# Patient Record
Sex: FEMALE
[De-identification: names, ages, dates, MRNs, and addresses within clinical notes are randomized per-mention and may not be internally consistent; named-entity substitution may affect disease eponyms.]

---

## 2019-06-02 ENCOUNTER — RX ONLY (RX ONLY)
Age: 64
End: 2019-06-02

## 2022-05-16 ENCOUNTER — TRANSCRIPTION ENCOUNTER (OUTPATIENT)
Age: 67
End: 2022-05-16

## 2022-05-17 ENCOUNTER — OUTPATIENT (OUTPATIENT)
Dept: OUTPATIENT SERVICES | Facility: HOSPITAL | Age: 67
LOS: 1 days | End: 2022-05-17
Payer: MEDICARE

## 2022-05-17 ENCOUNTER — APPOINTMENT (OUTPATIENT)
Dept: DISASTER EMERGENCY | Facility: HOSPITAL | Age: 67
End: 2022-05-17

## 2022-05-17 VITALS
TEMPERATURE: 98 F | WEIGHT: 259.93 LBS | HEART RATE: 70 BPM | OXYGEN SATURATION: 95 % | HEIGHT: 70 IN | DIASTOLIC BLOOD PRESSURE: 82 MMHG | SYSTOLIC BLOOD PRESSURE: 127 MMHG | RESPIRATION RATE: 18 BRPM

## 2022-05-17 VITALS
OXYGEN SATURATION: 95 % | HEART RATE: 60 BPM | RESPIRATION RATE: 17 BRPM | TEMPERATURE: 98 F | SYSTOLIC BLOOD PRESSURE: 108 MMHG | DIASTOLIC BLOOD PRESSURE: 70 MMHG

## 2022-05-17 DIAGNOSIS — U07.1 COVID-19: ICD-10-CM

## 2022-05-17 PROCEDURE — M0222: CPT

## 2022-05-17 RX ORDER — BEBTELOVIMAB 87.5 MG/ML
175 INJECTION, SOLUTION INTRAVENOUS ONCE
Refills: 0 | Status: COMPLETED | OUTPATIENT
Start: 2022-05-17 | End: 2022-05-17

## 2022-05-17 RX ADMIN — BEBTELOVIMAB 175 MILLIGRAM(S): 87.5 INJECTION, SOLUTION INTRAVENOUS at 13:57

## 2022-05-17 NOTE — CHART NOTE - NSCHARTNOTEFT_GEN_A_CORE
CC: Monoclonal Antibody Injection - COVID 19 Positive or significant COVID exposure       History: Patient presents for an injection of Bebtelovimab monoclonal antibodies. Patient has been screened and was deemed to be a candidate.    Date tested COVID positive: 5/14      COVID Symptoms:  Date of onset: 5/13  * Fever-  * Cough-  * SOB-  * General Malaise+  * Headache-  * Taste / Smell changes-   * nausea / vomiting-   * congestion+      Risk Profile includes:   * DM-    * HTN-   * Cancer-    * Kidney Disease-    * Obesity-   * Asthma / lung disease-   * Past Cancer-   * OTHER+ dilated aorta      Vaccination Status:   Date received 2nd dose: 2/21  Booster Vaccine: 5/22      penicillin (Hives)      bebtelovimab (EUA) Injectable 175 milliGRAM(s) IV Push once        PMHx:  Infection due to severe acute respiratory syndrome coronavirus 2 (SARS-CoV-2)    MEWS Score    COVID-19          T(C): 36.7 (05-17-22 @ 13:35), Max: 36.7 (05-17-22 @ 13:35)  HR: 70 (05-17-22 @ 13:35) (70 - 70)  BP: 127/82 (05-17-22 @ 13:35) (127/82 - 127/82)  RR: 18 (05-17-22 @ 13:35) (18 - 18)  SpO2: 95% (05-17-22 @ 13:35) (95% - 95%)      PE- Well developed, well-nourish, resting comfortably in NAD.    Pulm- Normal rate.  No distress.  Abd soft and non-tender.   Neuro- A&Ox3, no gross sensory deficits to light touch or motor weaknesses.   Vasc- No peripheral edema or venous stasis noted.  Skin- No ecchymosis or bleeding.  MS- No bony tenderness       ASSESSMENT:  Pt is a 67y year old Female COVID positive and symptomatic who was referred for a single injection of Bebtelovimab monoclonal antibody treatment.      PLAN:  - Injection procedure explained to patient   - Consent for monoclonal antibody infusion obtained   - Risk & benefits discussed/all questions answered  - Injection of Bebtelovimab  - Will observe patient for one hour post injection and then discharge home with outpatient follow up as planned by PMD.    POST INFUSION ASSESSMENT:   DISCHARGE at approximately  1500 hours    - Patient tolerated injection - well denies complaints of chest pain, SOB, dizziness or palpitations  - VSS for discharge home  - D/C instructions given/ fact sheet included.  - Patient to follow-up with PCP as needed.

## 2022-07-25 ENCOUNTER — RESULT REVIEW (OUTPATIENT)
Age: 67
End: 2022-07-25

## 2023-09-13 PROBLEM — Z00.00 ENCOUNTER FOR PREVENTIVE HEALTH EXAMINATION: Status: ACTIVE | Noted: 2023-09-13

## 2024-08-02 ENCOUNTER — RX ONLY (RX ONLY)
Age: 69
End: 2024-08-02

## 2024-08-13 ENCOUNTER — OFFICE (OUTPATIENT)
Dept: URBAN - METROPOLITAN AREA CLINIC 115 | Facility: CLINIC | Age: 69
Setting detail: OPHTHALMOLOGY
End: 2024-08-13
Payer: MEDICARE

## 2024-08-13 DIAGNOSIS — D31.31: ICD-10-CM

## 2024-08-13 DIAGNOSIS — H33.052: ICD-10-CM

## 2024-08-13 DIAGNOSIS — H43.812: ICD-10-CM

## 2024-08-13 PROCEDURE — 92134 CPTRZ OPH DX IMG PST SGM RTA: CPT | Performed by: OPHTHALMOLOGY

## 2024-08-13 PROCEDURE — 92004 COMPRE OPH EXAM NEW PT 1/>: CPT | Performed by: OPHTHALMOLOGY

## 2024-08-13 ASSESSMENT — CONFRONTATIONAL VISUAL FIELD TEST (CVF)
OS_FINDINGS: FULL
OD_FINDINGS: FULL

## 2025-04-26 ENCOUNTER — RX ONLY (RX ONLY)
Age: 70
End: 2025-04-26

## 2025-06-03 ENCOUNTER — RX ONLY (RX ONLY)
Age: 70
End: 2025-06-03

## 2025-06-10 ENCOUNTER — RX ONLY (RX ONLY)
Age: 70
End: 2025-06-10

## 2025-07-21 ENCOUNTER — APPOINTMENT (OUTPATIENT)
Dept: URBAN - METROPOLITAN AREA CLINIC 102 | Facility: CLINIC | Age: 70
Setting detail: DERMATOLOGY
End: 2025-07-21

## 2025-07-21 DIAGNOSIS — L40.0 PSORIASIS VULGARIS: ICD-10-CM

## 2025-07-21 PROCEDURE — ? PRESCRIPTION

## 2025-07-21 PROCEDURE — ? COUNSELING

## 2025-07-21 PROCEDURE — ? PRESCRIPTION MEDICATION MANAGEMENT

## 2025-07-21 PROCEDURE — ?

## 2025-07-21 RX ORDER — BETAMETHASONE DIPROPIONATE 0.5 MG/G
CREAM TOPICAL
Qty: 45 | Refills: 2 | Status: ERX | COMMUNITY
Start: 2025-07-21

## 2025-07-21 RX ORDER — ROFLUMILAST 3 MG/G
CREAM TOPICAL
Qty: 60 | Refills: 3 | Status: ERX | COMMUNITY
Start: 2025-07-21

## 2025-07-21 RX ADMIN — ROFLUMILAST: 3 CREAM TOPICAL at 00:00

## 2025-07-21 RX ADMIN — BETAMETHASONE DIPROPIONATE: 0.5 CREAM TOPICAL at 00:00

## 2025-07-21 ASSESSMENT — PGA PSORIASIS: PGA PSORIASIS 2020: MODERATE

## 2025-07-21 ASSESSMENT — LOCATION SIMPLE DESCRIPTION DERM
LOCATION SIMPLE: RIGHT PRETIBIAL REGION
LOCATION SIMPLE: LEFT FOREARM
LOCATION SIMPLE: RIGHT FOREARM
LOCATION SIMPLE: LEFT PRETIBIAL REGION

## 2025-07-21 ASSESSMENT — LOCATION DETAILED DESCRIPTION DERM
LOCATION DETAILED: RIGHT PROXIMAL PRETIBIAL REGION
LOCATION DETAILED: RIGHT VENTRAL DISTAL FOREARM
LOCATION DETAILED: LEFT PROXIMAL PRETIBIAL REGION
LOCATION DETAILED: LEFT PROXIMAL DORSAL FOREARM
LOCATION DETAILED: RIGHT PROXIMAL DORSAL FOREARM
LOCATION DETAILED: LEFT VENTRAL PROXIMAL FOREARM

## 2025-07-21 ASSESSMENT — ITCH NUMERIC RATING SCALE: HOW SEVERE IS YOUR ITCHING?: 4

## 2025-07-21 ASSESSMENT — LOCATION ZONE DERM
LOCATION ZONE: LEG
LOCATION ZONE: ARM

## 2025-07-21 ASSESSMENT — BSA PSORIASIS: % BODY COVERED IN PSORIASIS: 8

## 2025-07-21 NOTE — HPI: OTHER
Condition:: Lesion check
Please Describe Your Condition:: Patient states in March she had noticed scaly spots which [place]\\nBX were taken which came back showing Psoriasiform Derm\\nPatient given topicals, none of which have cleared areas and she has noticed more spots. \\nNames of topicals patient has tried: \\nTAC Cream \\nFluocinonide Cream\\nCalcipotriene Cream\\n\\nPatient wanted to discuss other treatment options for Psoriasis with having HX  of having breast, cancer, and rheumatoid arthritis.

## 2025-07-21 NOTE — PROCEDURE: MIPS QUALITY
Quality 47: Advance Care Plan: Advance Care Planning discussed and documented; advance care plan or surrogate decision maker documented in the medical record.
Quality 226: Preventive Care And Screening: Tobacco Use: Screening And Cessation Intervention: Patient screened for tobacco use and is an ex/non-smoker
Quality 508: Adult Covid-19 Vaccination Status: Patients who are not up to date on their COVID-19 vaccinations as defined by CDC recommendations on current vaccination
Detail Level: Detailed
125

## 2025-07-21 NOTE — PROCEDURE: PRESCRIPTION MEDICATION MANAGEMENT
Initiate Treatment: betamethasone dipropionate 0.05 % topical cream \\nApply on affected area of body once daily for 2 weeks on, 1 week off as needed for psoriasis flares. DO NOT APPLY ON FACE\\n\\nOff weeks:\\nZoryve 0.3% Cream daily
Detail Level: Zone
Plan: Discussed chronicity with flares. Start betamethasone and zoryve. Follow up in 2 months.
Render In Strict Bullet Format?: No

## 2025-08-15 ENCOUNTER — OFFICE (OUTPATIENT)
Dept: URBAN - METROPOLITAN AREA CLINIC 63 | Facility: CLINIC | Age: 70
Setting detail: OPHTHALMOLOGY
End: 2025-08-15
Payer: MEDICARE

## 2025-08-15 DIAGNOSIS — Z79.899: ICD-10-CM

## 2025-08-15 DIAGNOSIS — D31.31: ICD-10-CM

## 2025-08-15 DIAGNOSIS — H43.813: ICD-10-CM

## 2025-08-15 DIAGNOSIS — H33.052: ICD-10-CM

## 2025-08-15 PROCEDURE — 92014 COMPRE OPH EXAM EST PT 1/>: CPT | Performed by: SPECIALIST

## 2025-08-15 PROCEDURE — 92134 CPTRZ OPH DX IMG PST SGM RTA: CPT | Performed by: SPECIALIST

## 2025-08-15 PROCEDURE — 92083 EXTENDED VISUAL FIELD XM: CPT | Performed by: SPECIALIST

## 2025-08-15 ASSESSMENT — REFRACTION_AUTOREFRACTION
OD_AXIS: 093
OS_SPHERE: +1.25
OS_CYLINDER: -1.75
OS_AXIS: 091
OD_SPHERE: +1.00
OD_CYLINDER: -2.00

## 2025-08-15 ASSESSMENT — VISUAL ACUITY
OD_BCVA: 20/25-1
OS_BCVA: 20/30

## 2025-08-15 ASSESSMENT — CONFRONTATIONAL VISUAL FIELD TEST (CVF)
OD_FINDINGS: FULL
OS_FINDINGS: FULL

## 2025-08-15 ASSESSMENT — KERATOMETRY
OD_AXISANGLE_DEGREES: 016
OS_K1POWER_DIOPTERS: 44.25
OD_K1POWER_DIOPTERS: 44.25
OD_K2POWER_DIOPTERS: 44.50
OS_AXISANGLE_DEGREES: 000
OS_K2POWER_DIOPTERS: 44.50

## 2025-08-15 ASSESSMENT — TONOMETRY
OS_IOP_MMHG: 15
OD_IOP_MMHG: 15